# Patient Record
(demographics unavailable — no encounter records)

---

## 2025-01-05 NOTE — HISTORY OF PRESENT ILLNESS
[FreeTextEntry1] : Mr. ROSITA LAWRENCE is a 45 year old male with a MHx of ileocolonic perianal fistulizing CD (dx 2008) on 5mg/kg IFX (off 6MP since late 2020), presenting for follow-up.  5/2023 - GI-visit - in deep remission on last scope 11/2019, plan for colonoscopy.    Today - he has no symptoms at all. He will have some urgency with coffee. Recently saw dermatologist within last 2 months and had no dermatologic findings. He is doing really well and no complaints. Otherwise kids and life is good. 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 He does

## 2025-01-05 NOTE — PHYSICAL EXAM
[Sclera] : the sclera and conjunctiva were normal [Normal Appearance] : the appearance of the neck was normal [Normal Color / Pigmentation] : normal skin color and pigmentation [Normal] : oriented to person, place, and time [de-identified] : no submandibular LAD

## 2025-01-05 NOTE — PHYSICAL EXAM
[Sclera] : the sclera and conjunctiva were normal [Normal Appearance] : the appearance of the neck was normal [Normal Color / Pigmentation] : normal skin color and pigmentation [Normal] : oriented to person, place, and time [de-identified] : no submandibular LAD

## 2025-01-05 NOTE — ASSESSMENT
[FreeTextEntry1] : Mr. ROSITA LAWRENCE is a 45 year old male with a MHx of ileocolonic perianal fistulizing CD (dx 2008) on 5mg/kg IFX (off 6MP since late 2020), presenting for follow-up.  Ileocolonic perianal fistulizing CD -diagnosed 2008, previously off 6MP since late 2020, currently on 5mg/kg IFX in clinical remission, in endoscopic remission as of colonoscopy in 2019 and without EIMs -will continue IFX 5mg/kg -obtain CBC, CMP, B12, folate, predictor-IFX -will schedule for colonoscopy to assess disease and CRC surveillance  IBD Healthcare Maintenance -discussed need for routine dermatology for which he is updated  RTC: s/p colonoscopy   MD Chad Obregon IBD Fellow [facility]   Discussed with Dr Estrada

## 2025-01-05 NOTE — ASSESSMENT
[FreeTextEntry1] : Mr. ROSITA LAWRENCE is a 45 year old male with a MHx of ileocolonic perianal fistulizing CD (dx 2008) on 5mg/kg IFX (off 6MP since late 2020), presenting for follow-up.  Ileocolonic perianal fistulizing CD -diagnosed 2008, previously off 6MP since late 2020, currently on 5mg/kg IFX in clinical remission, in endoscopic remission as of colonoscopy in 2019 and without EIMs -will continue IFX 5mg/kg -obtain CBC, CMP, B12, folate, predictor-IFX -will schedule for colonoscopy to assess disease and CRC surveillance  IBD Healthcare Maintenance -discussed need for routine dermatology for which he is updated  RTC: s/p colonoscopy   MD Chad Obregon IBD Fellow [facility]   Discussed with Dr Estrada

## 2025-01-05 NOTE — HISTORY OF PRESENT ILLNESS
[FreeTextEntry1] : Mr. ROSITA LAWRENCE is a 45 year old male with a MHx of ileocolonic perianal fistulizing CD (dx 2008) on 5mg/kg IFX (off 6MP since late 2020), presenting for follow-up.  5/2023 - GI-visit - in deep remission on last scope 11/2019, plan for colonoscopy.    Today - he has no symptoms at all. He will have some urgency with coffee. Recently saw dermatologist within last 2 months and had no dermatologic findings. He is doing really well and no complaints. Otherwise kids and life is good. 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 He does